# Patient Record
Sex: MALE | URBAN - METROPOLITAN AREA
[De-identification: names, ages, dates, MRNs, and addresses within clinical notes are randomized per-mention and may not be internally consistent; named-entity substitution may affect disease eponyms.]

---

## 2024-04-29 ENCOUNTER — TELEPHONE (OUTPATIENT)
Dept: NEUROSURGERY | Facility: CLINIC | Age: 74
End: 2024-04-29

## 2024-04-29 NOTE — TELEPHONE ENCOUNTER
S/W patient and spouse in regards to incorrectly scheduled appointment with Dr. Franks for 05/08/24.  Patient was unsure of what appointment was for and who scheduled it.  Patient was scheduled with wrong provider per dx.  Wife will contact clinic for further advice.  Will cx incorrectly scheduled appointment.